# Patient Record
Sex: MALE | Race: BLACK OR AFRICAN AMERICAN | NOT HISPANIC OR LATINO | URBAN - METROPOLITAN AREA
[De-identification: names, ages, dates, MRNs, and addresses within clinical notes are randomized per-mention and may not be internally consistent; named-entity substitution may affect disease eponyms.]

---
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
## 2021-05-27 ENCOUNTER — RECORDS - HEALTHEAST (OUTPATIENT)
Dept: ADMINISTRATIVE | Facility: CLINIC | Age: 21
End: 2021-05-27

## 2021-05-30 VITALS — HEIGHT: 67 IN | BODY MASS INDEX: 23.7 KG/M2 | WEIGHT: 151 LBS

## 2021-05-30 VITALS — WEIGHT: 152.7 LBS | BODY MASS INDEX: 23.97 KG/M2 | HEIGHT: 67 IN

## 2021-05-31 VITALS — HEIGHT: 66 IN | WEIGHT: 154.4 LBS | BODY MASS INDEX: 24.81 KG/M2

## 2021-05-31 VITALS — BODY MASS INDEX: 24.75 KG/M2 | HEIGHT: 66 IN | WEIGHT: 154 LBS

## 2021-06-09 NOTE — PROGRESS NOTES
Roomed by: Megan Dietz CMA    Accompanied by Mother    Refills needed? No    Do you have any forms that need to be filled out? No        Vitals:    02/28/17 1150   BP: 102/52       Chief Complaint   Patient presents with     Asthma       HPI:  Trouble with breathing  Sometimes has to open windows to get cold air which helps    problems with breathing with running    Using albuterol before PE   Still has problems, uses it again in PE    Still having problems then    All the time having some problems  Getting worse about 2 weeks ago    Last time needed prednisone was in Nov or December    No ED appointments    Last Urgent Care visit was in Dec/Jan    In the last week has needed albuterol more   Needs it at night and before school in the AM    Waking at night with breathing problems    This has gone on his whole  Life    Uses albuterol when he awakens at night and able to go back to sleep    ----------------------------------------    Using Pataday for his eyes  Relieves the itching  But eyes remain red      ROS:    Head hurts with coughing hard  Fever: no  Runny nose: sometimes      Wakeful: yes          SH:   no one else ill at home          ================================    Physical Exam:    General Appearance:   Alert, NAD   Eyes: clear    Ears:  Right TM:  clear   Left TM:  clear   Nose: clear    Throat:  clear       Neck:   Supple, No significant adenopathy   Lungs:  clear                Cardiac:   S1, S2 nl  Abdomen: soft without mass or organomegaly    No orders of the defined types were placed in this encounter.       Assessment:    1. Severe persistent asthma        Plan: See Patient Instructions.    Medications Ordered   Medications     predniSONE (DELTASONE) 20 MG tablet     Sig: Take 2 tablets (40 mg total) by mouth 2 (two) times a day for 5 days.     Dispense:  20 tablet     Refill:  2       Patient Instructions     Plan:    Continue the same medications as on his asthma action plan from October  2015.    I will give him a five-day course of prednisone today.  Hopefully this will help him start to feel better.    He is on many asthma medications.   Besides a short course of prednisone I do not know of any other medications to add.    Discussed that when his asthma is under good control he should need his albuterol as a rescue inhaler only 1-2 times per week.    He needs to be seen by one of our allergists.  Cards given for Dr. Knowles or Dr. Holt.  I'm concerned that he may have an allergy that's making his asthma worse.  Concerned that he may have an allergy causing his skin to itch.        When you see the allergist, ask when Duc should follow up with me.

## 2021-06-09 NOTE — PROGRESS NOTES
Assessment:    Allergic rhinitis with sensitivity to tree pollen, grass pollen, weed pollen, dust mite, cat and mold.    Persistent asthma with incomplete control.  Normal spirometry and nitric oxide.  Previous history of prematurity with intubation.      Plan:  Blood tests for IgE.  Family wants to wait at this time.  We can obtain at follow-up appointment if choosing to do Xolair.  Continue fluticasone daily   Continue topical triamcinolone.    immediate moisturizing after showers   Daily cetirizine 10 mg.  An evening dose can be done as well if needed.    Recommend step up of asthma medication.  Advair 230--2 puffs twice a day  Spacer given and teaching done.  Consider Xolair.  Consider allergy shots.  Reviewed these 2 therapies and information given.  Follow-up in 6 weeks  ____________________________________________________________________________                                                             Rolf is here today with his mother and brother for evaluation of allergies and asthma.  He has a long history of asthma and allergies.  He describes shortness of breath, wheezing that is triggered by exercise.  He also gets symptoms overnight and is often woken up with it.  He describes cold air as a trigger.  Mom recalls that he was hospitalized as an infant.  He does get systemic steroids several times per year.  Mom estimates approximately 3 times over the past 12 months.  The last one was in February.  He also has allergy symptoms with chronic nasal congestion and rhinorrhea.  He has frequent sneezing.  It is year round but mom feels that it is worse in the spring.  He also has generalized itching.      Review of symptoms:  as above otherwise negative.    Past medical history: 28 week premature infant.  Mom does recall that he was on a ventilator.  She does not know the duration of treatment.    Allergies: No known allergies to medications, latex , foods or hymenoptera venom.    Family history: Father  sister and brother with asthma.  Multiple members with allergy.    Social history: Currently is looking same house since 2009.  It has central air-conditioning.  No visible water seepage or mold.  No pets in the home.  No significant cigarette smoke exposure.    Medications: Reviewed in chart.    Physical Exam:  General:  Alert and oriented.  Eyes:  Sclera clear.  Ears: TMs translucent grey with bony landmarks visible. Nose: Pale, boggy mucosal membranes.  Throat: Pink, mosit.  No lesions.  Neck: Supple.  No lymphadenopathy.  Lungs: CTA.  CV: Regular rate and rhythm. Extremities: Well perfused.  No clubbing or cyanosis. Skin: No rash.    Spirometry: FEV1 to FVC ratio is 92%.  FEV1 is 3.4 L which is 106% of predicted.  FVC is 3.7 L which is 100% of predicted.  This is normal spirometry    Nitric oxide is 15 ppb    Last Percutaneous Allergy Test Results  Trees  Alon, White  1:20 H  (W/F in mm): 3/15 (03/30/17 1004)  Birch Mix 1:20 H (W/F in mm): 4/15 (03/30/17 1004)  [place], Common 1:20 H (W/F in mm): 5/18 (03/30/17 1004)  Elm, American 1:20 H (W/F in mm): 3/15 (03/30/17 1004)  Phillips, Shagbark 1:20 H (W/F in mm): 0 (03/30/17 1004)  Maple, Hard/Sugar 1:20 H (W/F in mm): 5/20 (03/30/17 1004)  Rio Mix 1:20 H (W/F in mm): 5/20 (03/30/17 1004)  Parks, Red 1:20 H (W/F in mm): 9/19 (03/30/17 1004)  Laurel Bloomery, American 1:20 H (W/F in mm): 3/f (03/30/17 1004)  [place] Tree 1:20 H (W/F in mm): 0 (03/30/17 1004)  Dust Mites  D. Pteronyssinus Mite 30,000 AU/ML H (W/F in mm): 7/15 (03/30/17 1004)  D. Farinae Mite 30,000 AU/ML H (W/F in mm: 9/22 (03/30/17 1004)  Grasses  Grass Mix #4 10,000 BAU/ML H: 3/f (03/30/17 1004)  Catrachito Grass 1:20 H (W/F in mm): 3/f (03/30/17 1004)  Cockroach  Cockroach Mix 1:10 H (W/F in mm): 3/f (03/30/17 1004)  Molds/Fungi  Alternaria Tenuis 1:10 H (W/F in mm): 5/15 (03/30/17 1004)  Aspergillus Fumigatus 1:10 H (W/F in mm): 0 (03/30/17 1004)  Homodendrum Cladosporioides 1:10 H (W/F in mm): 3/f  (03/30/17 1004)  Penicillin Notatum 1:10 H (W/F in mm): 3/f (03/30/17 1004)  Epicoccum 1:10 H (W/F in mm): 3/f (03/30/17 1004)  Weeds  Ragweed, Short 1:20 H (W/F in mm): 5/20 (03/30/17 1004)  Dock, Sorrel 1:20 H (W/F in mm): 0 (03/30/17 1004)  Lamb's Quarter 1:20 H (W/F in mm): 7/10 (03/30/17 1004)  Pigweed, Rough Red Root 1:20 H  (W/F in mm): 5/15 (03/30/17 1004)  Plantain, English 1:20 H  (W/F in mm): 3/f (03/30/17 1004)  Sagebrush, Mugwort 1:20 H  (W/F in mm): 9/22 (03/30/17 1004)  Animal  Cat 10,000 BAU/ML H (W/F in mm): 7/25 (03/30/17 1004)  Dog 1:10 H (W/F in mm): 0 (03/30/17 1004)  Controls  Device Type: QUINTIP (03/30/17 1004)  Neg. control: 50% Glycerine/Saline H (W/F in mm): 0 (03/30/17 1004)  Pos. control: Histamine 6mg/ML (W/F in mms): 7/30 (03/30/17 1004)     This transcription uses voice recognition software, which may contain typographical errors.

## 2021-06-15 PROBLEM — J45.50 SEVERE PERSISTENT ASTHMA (H): Status: ACTIVE | Noted: 2017-03-14

## 2021-06-15 NOTE — PROGRESS NOTES
Assessment:    1. Eczema    2. Asthma    3. Allergic rhinoconjunctivitis    4. Moderate persistent asthma without complication    5. Persistent asthma    6. Constipation        Plan: See Patient Instructions.    Medications Ordered   Medications     dextroamphetamine-amphetamine (ADDERALL XR) 15 MG 24 hr capsule     Sig: Take 1 capsule (15 mg total) by mouth every morning.     Dispense:  30 capsule     Refill:  0     dextroamphetamine-amphetamine (ADDERALL) 10 mg Tab tablet     Sig: Take 1 tablet by mouth daily. In the afternoon     Dispense:  30 tablet     Refill:  0     ketotifen (ZADITOR/ZYRTEC ITCHY EYES) 0.025 % (0.035 %) ophthalmic solution     Sig: Administer 1 drop to both eyes 2 (two) times a day.     Dispense:  5 mL     Refill:  11     polyethylene glycol 3350,bulk, Powd     Sig: Mix 1 capful (17 grams) in 8 oz. Of fluid and drink     Dispense:  500 g     Refill:  0     predniSONE (DELTASONE) 20 MG tablet     Sig: Take 1 tablet (20 mg total) by mouth 2 (two) times a day for 5 days.     Dispense:  10 tablet     Refill:  0     Dispense as generic.     psyllium (FIBER SMOOTH) Powd     Sig: Use as instructed.one teaspoon in 8 oz water.     Dispense:  283 each     Refill:  11       Patient Instructions   Prednisone for 5 days.    Medications refilled.    Follow up with Dr. Holt.    Release of information for [facility]ay[facility] Busch    I plan to review the records to decide on the next follow up with me.      Discussed using the triamcinolone cream for the flaking skin on the ears and chin.    In addition, I told mother I would get his outside records and find out when to recommend follow-up in the clinic again for well care and also for his ADHD.    Discussed that I am concerned about the amount of difficulty he is having with his asthma.  He is on virtually all of the medicines that I know to use for asthma.  Since he is recently worse I did advise a 5 day course of prednisone.  I very strongly think he needs to  be followed up by an allergist.  Mother said she would like to come here now and I gave her the contact information for Dr. Holt.    I spent 45 minutes with the patient and his mother, greater than 50% of the time in counseling about the above concerns.      Roomed by: Delmis    Accompanied by Mother    Refills needed? Yes asthma        Vitals:    12/29/17 1632   BP: 110/68   Pulse: 68   SpO2: 97%       Chief Complaint   Patient presents with     Follow-up     asthma        HPI:    Plan from allergy visit March 2017:  Plan:  Blood tests for IgE.  Family wants to wait at this time.  We can obtain at follow-up appointment if choosing to do Xolair.  Continue fluticasone daily   Continue topical triamcinolone.    immediate moisturizing after showers   Daily cetirizine 10 mg.  An evening dose can be done as well if needed.    Recommend step up of asthma medication.  Advair 230--2 puffs twice a day  Spacer given and teaching done.  Consider Xolair.  Consider allergy shots.  Reviewed these 2 therapies and information given.  Follow-up in 6 weeks     rash on ears   Began  2 weeks ago   Went aaway and then came back again 2 days  It itches   Put trimcinolone, helped a little - using once a day    Breathing Sx same os brother  Has to stop actiity in PE    going to [facility] as his brother has    Cold began a week ago    2 days ago had coughing at night    Has some cold SX    Used his nebulizer  Also used inhaler    He has been having a lot of trouble with his asthma.  He has shortness of breath 3-6 times a week.  His asthma bothers him most of the time.  He is waking up 2-3 nights a week because of asthma difficulty.  He is needing his rescue inhaler 1-2 times every day.  He rates his asthma as somewhat controlled.    For his asthma he is been taking albuterol, he may use either the inhaler or the nebulizer.  Also taking Advair.    Since his cold began a week ago his symptoms have gotten worse.    Mother requested  refills on all of his medications today.  Besides coming here today he has been going to [facility].  Mother says the boys wanted to come and see me today.  Last visit at [facility]  was 1-2 weeks ago.  I do not have access to those records right now.    Mother says he needs to use eyedrops for itchy eyes.  She was not sure if this was Patanol or Zaditor.  Ultimately a refill was put in for the Patanol  He also takes fluticasone nasal spray and montelukast for his allergy symptoms.  The montelukast a course helps with the asthma.    He has difficulty with eczema.  He is using triamcinolone for that.  It does help with the itching.      ROS:      Fever: no  Runny nose: yes  Cough:yes    Wakeful: yes    SH:   Sib also ill       ACT score is 12.    Spirometry: He had some difficulty doing the test.  It was repeated and the exact same results were obtained.    Results were not consistent.  It does appear the spirometry is probably normal.    ================================    Physical Exam:    General Appearance:   Alert, NAD   Eyes: clear    Ears:  Right TM:  clear   Left TM:  clear   Nose: clear    Throat:  clear       Neck:   Supple, No significant adenopathy   Lungs:  clear                Cardiac:   S1, S2 nl  Skin: There is some flakiness to the skin on both pinna.  Also on the chin.    Orders Placed This Encounter   Procedures     Influenza, Seasonal Quad, Preservative Free 36+ Months

## 2021-06-15 NOTE — PROGRESS NOTES
Assessment:     Allergic rhinitis with sensitivity to tree pollen, grass pollen, weed pollen, dust mite, cat and mold.     Persistent asthma with incomplete control.  Normal spirometry and nitric oxide.  Previous history of prematurity with intubation.       Plan:    Continue fluticasone daily   Continue Advair 230--2 puffs twice a day  Continue Montelukast 10mg daily  Consider Xolair.    ____________________________________________________________________________     Rolf comes in today with his brother and mother for evaluation of asthma.  He was last seen in March 2017.  Since that time he has been on systemic steroids 3 times.  He is using albuterol nearly daily.  His triggers include exercise and illness.  He reports using his Advair regularly without missed doses.  He is continuing to use montelukast daily.  He is using fluticasone off and on.  We discussed previously using Xolair as a therapy however he is afraid of using an injectable medication.    Physical Exam:  General:  Alert and Oriented.  Eyes:  Sclera clear. Nose: pale boggy mucosal membranes.  Throat:  pink moist, no lesions.  Lungs:  clear to auscultation. Skin:  no rashes    Spirometry: FEV1 to FVC ratio is 79%.  FEV1 is 3.51 L which is 104% of predicted.  FVC is 4.43 L which is 115% of predicted.  This is normal spirometry    Nitric oxide is 17 ppb    15 min spent in direct contact with the patient.  More than 50% in counseling and coordination of care.

## 2021-06-16 PROBLEM — K59.00 CONSTIPATION: Status: ACTIVE | Noted: 2018-01-25

## 2021-06-16 PROBLEM — M54.50 LOWER BACK PAIN: Status: ACTIVE | Noted: 2018-05-29
